# Patient Record
Sex: FEMALE | Race: WHITE | Employment: FULL TIME | URBAN - METROPOLITAN AREA
[De-identification: names, ages, dates, MRNs, and addresses within clinical notes are randomized per-mention and may not be internally consistent; named-entity substitution may affect disease eponyms.]

---

## 2021-10-12 ENCOUNTER — HOSPITAL ENCOUNTER (EMERGENCY)
Age: 26
Discharge: HOME | End: 2021-10-12
Attending: EMERGENCY MEDICINE
Payer: COMMERCIAL

## 2021-10-12 VITALS
BODY MASS INDEX: 17.07 KG/M2 | HEART RATE: 78 BPM | OXYGEN SATURATION: 99 % | RESPIRATION RATE: 16 BRPM | DIASTOLIC BLOOD PRESSURE: 76 MMHG | WEIGHT: 100 LBS | SYSTOLIC BLOOD PRESSURE: 112 MMHG | HEIGHT: 64 IN | TEMPERATURE: 98.1 F

## 2021-10-12 DIAGNOSIS — S01.511A LIP LACERATION, INITIAL ENCOUNTER: Primary | ICD-10-CM

## 2021-10-12 PROCEDURE — 75810000293 HC SIMP/SUPERF WND  RPR

## 2021-10-12 PROCEDURE — 99283 EMERGENCY DEPT VISIT LOW MDM: CPT

## 2021-10-12 RX ORDER — DEXTROAMPHETAMINE SACCHARATE, AMPHETAMINE ASPARTATE, DEXTROAMPHETAMINE SULFATE AND AMPHETAMINE SULFATE 3.75; 3.75; 3.75; 3.75 MG/1; MG/1; MG/1; MG/1
15 TABLET ORAL
COMMUNITY

## 2021-10-12 NOTE — LETTER
[address] [address] EMERGENCY DEPT  [address] [facility] [id number] [phone]    Work/School Note    Date: 10/12/2021    To Whom It May concern:    Faviola Chong was seen and treated today in the emergency room by the following provider(s):  Attending Provider: Barak Colmenares MD  Physician Assistant: MARIANA Rhodes. Faviola Chong is excused from work/school on 10/12/21 and 10/13/21. She is medically clear to return to work/school on 10/14/2021.        Sincerely,          MARIANA Wilder

## 2021-10-12 NOTE — ED TRIAGE NOTES
Pt reports she had been drinking last night and woke this morning, pt states she fell and hit her mouth on her bed frame. Pt denies LOC. Pt with laceration to left side of lip.     Abel Harding RN

## 2021-10-12 NOTE — ED PROVIDER NOTES
Patient to ER status post fall this morning she patient states she had been drinking last night. She hit her mouth on the side of the bed and has laceration to the left lip area she denies any loss of consciousness states last tetanus was less than 5 years ago. She was driven here by boyfriend's mother, pt very anxious     The history is provided by the patient. Fall  The accident occurred 1 to 2 hours ago. The fall occurred while walking. She fell from a height of ground level. Impact surface: wooden bed frame. Point of impact: mouth. Pain location: upper lip  The pain is at a severity of 6/10. The pain is mild. She was ambulatory at the scene. There was no entrapment after the fall. There was alcohol use involved in the accident. Associated symptoms include laceration. Pertinent negatives include no loss of consciousness. The symptoms are aggravated by pressure on injury. She has tried ice for the symptoms. The treatment provided mild relief. The patient's last tetanus shot was less than 5 years ago. History reviewed. No pertinent past medical history. History reviewed. No pertinent surgical history. History reviewed. No pertinent family history. Social History     Socioeconomic History    Marital status: SINGLE     Spouse name: Not on file    Number of children: Not on file    Years of education: Not on file    Highest education level: Not on file   Occupational History    Not on file   Tobacco Use    Smoking status: Never Smoker    Smokeless tobacco: Never Used   Substance and Sexual Activity    Alcohol use:  Yes    Drug use: Not Currently    Sexual activity: Not on file   Other Topics Concern    Not on file   Social History Narrative    Not on file     Social Determinants of Health     Financial Resource Strain:     Difficulty of Paying Living Expenses:    Food Insecurity:     Worried About Running Out of Food in the Last Year:     [address] in the Last Year: Transportation Needs:     Lack of Transportation (Medical):  Lack of Transportation (Non-Medical):    Physical Activity:     Days of Exercise per Week:     Minutes of Exercise per Session:    Stress:     Feeling of Stress :    Social Connections:     Frequency of Communication with Friends and Family:     Frequency of Social Gatherings with Friends and Family:     Attends [employer] Services:     Active Member of Clubs or Organizations:     Attends Club or Organization Meetings:     Marital Status:    Intimate Partner Violence:     Fear of Current or Ex-Partner:     Emotionally Abused:     Physically Abused:     Sexually Abused: ALLERGIES: Augmentin [amoxicillin-pot clavulanate]    Review of Systems   Neurological: Negative for loss of consciousness. All other systems reviewed and are negative. Vitals:    10/12/21 1016   BP: 108/79   Pulse: 73   Resp: 16   Temp: 98.3 °F (36.8 °C)   SpO2: 98%   Weight: 45.4 kg (100 lb)   Height: 5' 4\" (1.626 m)            Physical Exam  Vitals and nursing note reviewed. Constitutional:       General: She is not in acute distress. Appearance: Normal appearance. She is well-developed and normal weight. She is not diaphoretic. Comments: Anxious    HENT:      Head: Normocephalic and atraumatic. Mouth/Throat:      Comments: 1.0 cm lac to corner of the mouth/upper lip. It does cross the vermilion border. No chipped or loose teeth. No active bleeding  Eyes:      Pupils: Pupils are equal, round, and reactive to light. Cardiovascular:      Rate and Rhythm: Normal rate and regular rhythm. Pulmonary:      Effort: Pulmonary effort is normal.      Breath sounds: Normal breath sounds. Abdominal:      General: Bowel sounds are normal.      Palpations: Abdomen is soft. Musculoskeletal:         General: Normal range of motion. Cervical back: Normal range of motion and neck supple. Skin:     General: Skin is warm.       Findings: Laceration present. Neurological:      Mental Status: She is alert and oriented to person, place, and time. MDM  Number of Diagnoses or Management Options  Diagnosis management comments: 1 cm lac to left upper lip with repair   Tetanus up to date        Amount and/or Complexity of Data Reviewed  Review and summarize past medical records: yes    Risk of Complications, Morbidity, and/or Mortality  Presenting problems: moderate  Diagnostic procedures: moderate  Management options: low    Patient Progress  Patient progress: improved         Wound Repair    Date/Time: 10/12/2021 11:52 AM  Performed by: Patricia Abad provider: Luc Corrales  Preparation: skin prepped with Betadine  Pre-procedure re-eval: Immediately prior to the procedure, the patient was reevaluated and found suitable for the planned procedure and any planned medications. Time out: Immediately prior to the procedure a time out was called to verify the correct patient, procedure, equipment, staff and marking as appropriate. .  Location details: lip  Wound length:2.5 cm or less  Anesthesia: local infiltration    Anesthesia:  Local Anesthetic: lidocaine 1% with epinephrine  Anesthetic total: 2 mL  Foreign bodies: no foreign bodies  Irrigation solution: saline  Debridement: none  Skin closure: 4-0 nylon  Number of sutures: 4  Technique: simple and interrupted  Approximation: close  Lip approximation: vermillion border well aligned  Dressing: antibiotic ointment  My total time at bedside, performing this procedure was 1-15 minutes.

## 2021-10-12 NOTE — DISCHARGE INSTRUCTIONS
Sutures to be removed in 1 week, may get the area wet do not scrub, ice to the area daily use Tylenol Motrin for pain

## 2021-10-12 NOTE — ED NOTES
I have reviewed discharge instructions with the patient. The patient verbalized understanding. Patient left ED via Discharge Method: ambulatory to Home with self. Opportunity for questions and clarification provided. Patient given 0 scripts. To continue your aftercare when you leave the hospital, you may receive an automated call from our care team to check in on how you are doing. This is a free service and part of our promise to provide the best care and service to meet your aftercare needs.  If you have questions, or wish to unsubscribe from this service please call [phone]. Thank you for Choosing our [facility] Emergency Department.

## 2023-05-15 RX ORDER — DEXTROAMPHETAMINE SACCHARATE, AMPHETAMINE ASPARTATE, DEXTROAMPHETAMINE SULFATE AND AMPHETAMINE SULFATE 3.75; 3.75; 3.75; 3.75 MG/1; MG/1; MG/1; MG/1
15 TABLET ORAL
COMMUNITY